# Patient Record
Sex: MALE | Race: BLACK OR AFRICAN AMERICAN
[De-identification: names, ages, dates, MRNs, and addresses within clinical notes are randomized per-mention and may not be internally consistent; named-entity substitution may affect disease eponyms.]

---

## 2019-07-28 ENCOUNTER — HOSPITAL ENCOUNTER (EMERGENCY)
Dept: HOSPITAL 72 - EMR | Age: 34
Discharge: HOME | End: 2019-07-28
Payer: SELF-PAY

## 2019-07-28 VITALS — SYSTOLIC BLOOD PRESSURE: 123 MMHG | DIASTOLIC BLOOD PRESSURE: 79 MMHG

## 2019-07-28 VITALS — DIASTOLIC BLOOD PRESSURE: 75 MMHG | SYSTOLIC BLOOD PRESSURE: 110 MMHG

## 2019-07-28 VITALS — BODY MASS INDEX: 22.5 KG/M2 | HEIGHT: 66 IN | WEIGHT: 140 LBS

## 2019-07-28 VITALS — DIASTOLIC BLOOD PRESSURE: 79 MMHG | SYSTOLIC BLOOD PRESSURE: 123 MMHG

## 2019-07-28 DIAGNOSIS — Z23: ICD-10-CM

## 2019-07-28 DIAGNOSIS — Z91.010: ICD-10-CM
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
## 2019-07-28 DIAGNOSIS — S51.812A: Primary | ICD-10-CM

## 2019-07-28 PROCEDURE — 90471 IMMUNIZATION ADMIN: CPT

## 2019-07-28 PROCEDURE — 90715 TDAP VACCINE 7 YRS/> IM: CPT

## 2019-07-28 PROCEDURE — 99283 EMERGENCY DEPT VISIT LOW MDM: CPT
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
## 2019-08-06 ENCOUNTER — HOSPITAL ENCOUNTER (EMERGENCY)
Dept: HOSPITAL 72 - EMR | Age: 34
Discharge: HOME | End: 2019-08-06
Payer: SELF-PAY

## 2019-08-06 VITALS — DIASTOLIC BLOOD PRESSURE: 92 MMHG | SYSTOLIC BLOOD PRESSURE: 144 MMHG

## 2019-08-06 VITALS — SYSTOLIC BLOOD PRESSURE: 144 MMHG | DIASTOLIC BLOOD PRESSURE: 92 MMHG

## 2019-08-06 VITALS — BODY MASS INDEX: 20.73 KG/M2 | WEIGHT: 140 LBS | HEIGHT: 69 IN

## 2019-08-06 DIAGNOSIS — L03.114: Primary | ICD-10-CM

## 2019-08-06 DIAGNOSIS — Z48.02: ICD-10-CM

## 2019-08-06 DIAGNOSIS — Z91.010: ICD-10-CM

## 2019-08-06 PROCEDURE — 99282 EMERGENCY DEPT VISIT SF MDM: CPT

## 2019-08-06 PROCEDURE — 29125 APPL SHORT ARM SPLINT STATIC: CPT

## 2019-08-06 NOTE — NUR
ER DISCHARGE NOTE:

Patient is cleared to be discharged per DANIELLE OTRRE  pt is aox4, on room air, 
with stable vital signs. pt was given dc and prescription instructions, pt was 
able to verbalize understanding, pt id band removed without complications. pt 
is able to ambulate with steady gait. pt took all belongings. dressing and 
splint applied as ordered by PA

## 2019-08-06 NOTE — NUR
ED Nurse Note:

Patient walked into ED c/o stitches removal on the left wrist/forearm, 

patient reports the [place] [facility] [place] 

Swelling, redness noted on the left wrist. patient reports throbbing pain 7/10.
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
